# Patient Record
Sex: FEMALE | Race: WHITE | NOT HISPANIC OR LATINO | Employment: FULL TIME | URBAN - METROPOLITAN AREA
[De-identification: names, ages, dates, MRNs, and addresses within clinical notes are randomized per-mention and may not be internally consistent; named-entity substitution may affect disease eponyms.]

---

## 2022-03-18 ENCOUNTER — OFFICE VISIT (OUTPATIENT)
Dept: NEUROLOGY | Facility: CLINIC | Age: 52
End: 2022-03-18

## 2022-03-18 ENCOUNTER — LAB (OUTPATIENT)
Dept: LAB | Facility: HOSPITAL | Age: 52
End: 2022-03-18

## 2022-03-18 VITALS
SYSTOLIC BLOOD PRESSURE: 132 MMHG | HEART RATE: 84 BPM | BODY MASS INDEX: 39.3 KG/M2 | DIASTOLIC BLOOD PRESSURE: 84 MMHG | RESPIRATION RATE: 16 BRPM | OXYGEN SATURATION: 98 % | HEIGHT: 67 IN | TEMPERATURE: 97.3 F | WEIGHT: 250.4 LBS

## 2022-03-18 DIAGNOSIS — G45.9 TIA (TRANSIENT ISCHEMIC ATTACK): ICD-10-CM

## 2022-03-18 DIAGNOSIS — G45.9 TIA (TRANSIENT ISCHEMIC ATTACK): Primary | ICD-10-CM

## 2022-03-18 DIAGNOSIS — F17.200 SMOKER: ICD-10-CM

## 2022-03-18 DIAGNOSIS — R23.3 EASY BRUISABILITY: ICD-10-CM

## 2022-03-18 DIAGNOSIS — R00.2 PALPITATIONS: ICD-10-CM

## 2022-03-18 LAB
APTT PPP: 26.2 SECONDS (ref 22–39)
CHOLEST SERPL-MCNC: 192 MG/DL (ref 0–200)
HBA1C MFR BLD: 5.6 % (ref 4.8–5.6)
HDLC SERPL-MCNC: 53 MG/DL (ref 40–60)
INR PPP: 0.94 (ref 0.85–1.16)
LDLC SERPL CALC-MCNC: 114 MG/DL (ref 0–100)
LDLC/HDLC SERPL: 2.1 {RATIO}
PROTHROMBIN TIME: 12.3 SECONDS (ref 11.4–14.4)
TRIGL SERPL-MCNC: 139 MG/DL (ref 0–150)
VLDLC SERPL-MCNC: 25 MG/DL (ref 5–40)

## 2022-03-18 PROCEDURE — 83036 HEMOGLOBIN GLYCOSYLATED A1C: CPT

## 2022-03-18 PROCEDURE — 36415 COLL VENOUS BLD VENIPUNCTURE: CPT

## 2022-03-18 PROCEDURE — 80061 LIPID PANEL: CPT

## 2022-03-18 PROCEDURE — 99204 OFFICE O/P NEW MOD 45 MIN: CPT | Performed by: PSYCHIATRY & NEUROLOGY

## 2022-03-18 PROCEDURE — 85730 THROMBOPLASTIN TIME PARTIAL: CPT

## 2022-03-18 PROCEDURE — 85610 PROTHROMBIN TIME: CPT

## 2022-03-18 PROCEDURE — 99406 BEHAV CHNG SMOKING 3-10 MIN: CPT | Performed by: PSYCHIATRY & NEUROLOGY

## 2022-03-18 NOTE — PROGRESS NOTES
Subjective:    CC: Heaven MOSQUEDA is seen today in consultation at the request of Surjit Carl MD for Establish Care (TIA)       HPI:  52-year-old female accompanied by her sister with no significant past medical history presents with a TIA.  As per patient she had symptoms of sudden onset right arm weakness and numbness along with word finding difficulties on 9/22.  She went to [facility] where her initial blood pressure was 154/95.  Patient states that her symptoms continued for a few hours.  She had a CT scan of the head that was unremarkable, MRI brain that showed minimal chronic ischemic changes but no acute intracranial abnormalities, carotid Doppler that showed minimal narrowing bilaterally and an echocardiogram that showed an EF of 55 to 60% with normal left atrial size and no PFO.  Patient was started on aspirin 81 mg daily.  Her blood work showed a hemoglobin of 15.5, creatinine of 1.23, GFR of 45.7 with negative UA.  Patient had a similar episode at age 18 when she had right facial drooping along with some speech changes.  Was told at the time she may have Bell's palsy.  She also complains of palpitations but never any syncopal episodes as well as easy bruisability where she forms large bruises even when she has minor impact.  She does smoke up to 1 pack of cigarettes a day.  There is a family history of seizures in her sister.  Of note-I personally reviewed her MRI brain and the extensive hospital notes from [facility]    The following portions of the patient's history were reviewed today and updated as of 03/18/2022  : allergies, current medications, past family history, past medical history, past social history, past surgical history and problem list  These document will be scanned to patient's chart.      Current Outpatient Medications:   •  BABY ASPIRIN PO, Take 81 mg by mouth., Disp: , Rfl:    History reviewed. No pertinent past medical history.   Past Surgical History:   Procedure  "Laterality Date   • GALLBLADDER SURGERY     • HYSTERECTOMY        Family History   Problem Relation Age of Onset   • Obesity Mother    • High cholesterol Mother    • Arthritis Mother    • Breast cancer Mother    • Osteoporosis Mother    • Obesity Father    • Migraines Sister    • Arthritis Sister    • Obesity Sister    • Hypertension Brother    • Stroke Maternal Uncle    • Stroke Paternal Uncle    • Diabetes Maternal Grandmother    • Hypertension Maternal Grandmother    • High cholesterol Maternal Grandmother    • Stroke Maternal Grandfather    • High cholesterol Maternal Grandfather    • Hypertension Maternal Grandfather    • Diabetes Maternal Grandfather    • Hypertension Son       Social History     Socioeconomic History   • Marital status:    Tobacco Use   • Smoking status: Current Every Day Smoker     Packs/day: 1.00     Types: Cigarettes   • Smokeless tobacco: Never Used   Vaping Use   • Vaping Use: Never used   Substance and Sexual Activity   • Alcohol use: Never   • Drug use: Never   • Sexual activity: Never     Review of Systems   All other systems reviewed and are negative.      Objective:    /84 (BP Location: Left arm, Patient Position: Sitting, Cuff Size: Adult)   Pulse 84   Temp 97.3 °F (36.3 °C) (Infrared)   Resp 16   Ht 170.2 cm (67\")   Wt 114 kg (250 lb 6.4 oz)   SpO2 98%   Breastfeeding No   BMI 39.22 kg/m²     Neurology Exam:    General apperance: OBESE     Mental status: Alert, awake and oriented to time place and person.    Recent and Remote memory: Intact.    Attention span and Concentration: Normal.     Language and Speech: Intact- No dysarthria.    Fluency, Naming , Repitition and Comprehension:  Intact    Cranial Nerves:   CN II: Visual fields are full. Intact. Fundi - Normal, No papillederma, Pupils - UMAIR  CN III, IV and VI: Extraocular movements are intact. Normal saccades.   CN V: Facial sensation is intact.   CN VII: Muscles of facial expression reveal no " asymmetry. Intact.   CN VIII: Hearing is intact. Whispered voice intact.   CN IX and X: Palate elevates symmetrically. Intact  CN XI: Shoulder shrug is intact.   CN XII: Tongue is midline without evidence of atrophy or fasciculation.     Ophthalmoscopic exam of optic disc-normal    Motor:  Right UE muscle strength 5/5. Normal tone.     Left UE muscle strength 5/5. Normal tone.      Right LE muscle strength5/5. Normal tone.     Left LE muscle strength 5/5. Normal tone.      Sensory: Normal light touch, vibration and pinprick sensation bilaterally.    DTRs: 2+ bilaterally in upper and lower extremities.    Babinski: Negative bilaterally.    Co-ordination: Normal finger-to-nose, heel to shin B/L.    Rhomberg: Negative.    Gait: Normal.  Could do tandem walking    Cardiovascular: Regular rate and rhythm without murmur, gallop or rub.    Assessment and Plan:  1. TIA (transient ischemic attack)  Patient most likely had a left hemispheric TIA.  MRI brain, echocardiogram and carotid Doppler were unremarkable.    She does complain of palpitations therefore I will order a Holter monitor  She can continue aspirin 81 mg daily for now  I will check a lipid panel and start her on statins if need be.  Goal LDL of less than 100  Will also check an A1c  Goal blood pressure less than 130/90.  I have told her to monitor her blood pressure regularly at home.  - Lipid Panel; Future  - Hemoglobin A1c; Future    2. Easy bruisability  I will check a PT, PTT and INR.  Platelet count was normal  - aPTT; Future  - Protime-INR; Future    3. Smoker  Currently smokes 1 pack of cigarettes a day.  I have counseled him strongly to gradually taper and quit smoking  If she is unable to stop by herself I will send her a prescription for nicotine patches       Return in about 5 months (around 8/18/2022).     I spent over 45 minutes with the patient face to face out of which over 50% (30 minutes) was spent in management, instructions and education.      Jeny Gupta MD

## 2022-03-21 ENCOUNTER — TELEPHONE (OUTPATIENT)
Dept: CARDIOLOGY | Facility: CLINIC | Age: 52
End: 2022-03-21

## 2022-03-21 NOTE — TELEPHONE ENCOUNTER
Left message on answering machine requesting patient return my call.  Patient needs to be scheduled a new patient appointment.